# Patient Record
Sex: FEMALE | Race: WHITE | NOT HISPANIC OR LATINO | Employment: FULL TIME | URBAN - METROPOLITAN AREA
[De-identification: names, ages, dates, MRNs, and addresses within clinical notes are randomized per-mention and may not be internally consistent; named-entity substitution may affect disease eponyms.]

---

## 2021-05-31 ENCOUNTER — RECORDS - HEALTHEAST (OUTPATIENT)
Dept: ADMINISTRATIVE | Facility: CLINIC | Age: 51
End: 2021-05-31

## 2024-11-22 ENCOUNTER — ANCILLARY PROCEDURE (OUTPATIENT)
Dept: MAMMOGRAPHY | Facility: HOSPITAL | Age: 54
End: 2024-11-22
Attending: OBSTETRICS & GYNECOLOGY
Payer: COMMERCIAL

## 2024-11-22 DIAGNOSIS — Z12.39 ENCOUNTER FOR OTHER SCREENING FOR MALIGNANT NEOPLASM OF BREAST: ICD-10-CM

## 2024-11-22 PROCEDURE — 77063 BREAST TOMOSYNTHESIS BI: CPT

## 2024-11-22 PROCEDURE — 77067 SCR MAMMO BI INCL CAD: CPT

## 2025-06-02 ENCOUNTER — TRANSCRIBE ORDERS (OUTPATIENT)
Dept: OTHER | Age: 55
End: 2025-06-02

## 2025-06-02 ENCOUNTER — MEDICAL CORRESPONDENCE (OUTPATIENT)
Dept: HEALTH INFORMATION MANAGEMENT | Facility: CLINIC | Age: 55
End: 2025-06-02
Payer: COMMERCIAL

## 2025-06-02 ENCOUNTER — TRANSFERRED RECORDS (OUTPATIENT)
Dept: HEALTH INFORMATION MANAGEMENT | Facility: CLINIC | Age: 55
End: 2025-06-02
Payer: COMMERCIAL

## 2025-06-02 DIAGNOSIS — N18.5 CKD (CHRONIC KIDNEY DISEASE) STAGE 5, GFR LESS THAN 15 ML/MIN (H): Primary | ICD-10-CM

## 2025-07-15 ENCOUNTER — OFFICE VISIT (OUTPATIENT)
Dept: SURGERY | Facility: CLINIC | Age: 55
End: 2025-07-15
Attending: INTERNAL MEDICINE
Payer: COMMERCIAL

## 2025-07-15 DIAGNOSIS — N18.5 CKD (CHRONIC KIDNEY DISEASE) STAGE 5, GFR LESS THAN 15 ML/MIN (H): ICD-10-CM

## 2025-07-15 PROCEDURE — 99204 OFFICE O/P NEW MOD 45 MIN: CPT | Performed by: SURGERY

## 2025-07-15 RX ORDER — ACETAMINOPHEN 325 MG/1
650 TABLET ORAL EVERY 6 HOURS PRN
COMMUNITY

## 2025-07-15 RX ORDER — CLOBETASOL PROPIONATE 0.5 MG/G
1 OINTMENT TOPICAL
COMMUNITY
Start: 2024-03-05

## 2025-07-15 RX ORDER — CETIRIZINE HYDROCHLORIDE 10 MG/1
10 TABLET ORAL DAILY
COMMUNITY

## 2025-07-15 RX ORDER — FAMOTIDINE 20 MG/1
TABLET, FILM COATED ORAL
COMMUNITY

## 2025-07-15 RX ORDER — GABAPENTIN 100 MG/1
CAPSULE ORAL
COMMUNITY

## 2025-07-15 NOTE — PROGRESS NOTES
General Surgery H&P  Adilene Finley MRN# [id number]   Age/Sex: 55 year old female YOB: 1970     Reason for visit: PD catheter eval        Referring physician: Dr. Nice                   Assessment and Plan:   Assessment:  1.  Polycystic kidney disease  2.  History of tubal ligation  3.  Morbid obesity    55-year-old female presenting with a history of polycystic kidney disease.  The patient is currently seeking transplant application at the [facility].  The patient had also considered AV access however the patient has very small veins.  We discussed the risks and benefits of the procedures of PD catheter.  I do believe that the patient is a good candidate for PD catheter.  Given her morbid obesity, I have explained that she does have a higher than average risk of potentially kicking the catheter within the subcutaneous tissue.  However these usually resolve with no significant intervention.    Plan:  -When the patient is ready, we will proceed with a robotic placement of PD catheter.      -If the patient would like to proceed with the PD catheter before its active use, we can also embed the PD catheter as well.    - The risks and benefits of the procedure were explained detail to the patient. The risks include infection, bleeding, damage to the surrounding structures. Patient verbalized understanding provided consent to undergo the procedure above.            Chief Complaint:     Chief Complaint   Patient presents with    Consult     PD cath consult         History is obtained from the patient    HPI:   Adilene Finley is a 55 year old female who presents to the general surgery team today for evaluation regarding PD catheter.  The patient has a history of polycystic kidney disease.  The patient has a strong genetic lineage of the polycystic kidney disease.  She has had multiple family members undergo renal failure from this disease.  Her father had undergone AV fistula formation.  The  patient was worked up for an AV fistula however the patient has very small veins and is not the best AV access candidate the upper extremities.  Patient would like to learn more about PD catheter today.    On evaluation today, the patient states that she has a history of tubal ligation.  No history of diverticulitis or significant bowel adhesions in the past.  The patient states that she is currently on the transplant list at the [facility].  Otherwise she has no further complaints at this time.            Past Medical History:   No past medical history on file.           Past Surgical History:   No past surgical history on file.          Social History:    reports that she quit smoking about 3 years ago. Her smoking use included cigarettes. She started smoking about 4 months ago. She has never used smokeless tobacco.           Family History:   No family history on file.           Allergies:     Allergies   Allergen Reactions    Seasonal Allergies Other (See Comments)     Eyes Water // Runny Nose    Losartan Nausea              Medications:     Prior to Admission medications    Medication Sig Start Date End Date Taking? Authorizing Provider   acetaminophen (TYLENOL) 325 MG tablet Take 650 mg by mouth every 6 hours as needed.   Yes Reported, Patient   cetirizine (ZYRTEC) 10 MG tablet Take 10 mg by mouth daily.   Yes Reported, Patient   clobetasol (TEMOVATE) 0.05 % external ointment Apply 1 Application topically. 3/5/24  Yes Reported, Patient   famotidine (PEPCID) 20 MG tablet Take 1 tablet by mouth 2 times daily as needed for upset stomach or GERD*   Yes Reported, Patient   gabapentin (NEURONTIN) 100 MG capsule TAKE 2 TO 3 CAPSULES BY MOUTH AT BEDTIME AS NEEDED FOR INSOMNIA*   Yes Reported, Patient              Review of Systems:   A 12 point Review of Systems is negative other than noted in the HPI            Physical Exam:   No data found.     [unfilled]   Constitutional:   awake, alert, cooperative, no  apparent distress, and appears stated age       Eyes:   PERRL, conjunctiva/corneas clear, EOM's intact; no scleral edema or icterus noted        ENT:   Normocephalic, without obvious abnormality, atraumatic, Lips, mucosa, and tongue normal        Hematologic / Lymphatic:   No lymphadenopathy       Lungs:   Normal respiratory effort, no accessory muscle use       Cardiovascular:   Regular rate and rhythm       Abdomen:   Soft, nondistended, nontender to palpation.  Obese abdomen       Musculoskeletal:   No obvious swelling, bruising or deformity       Skin:   Skin color and texture normal for patient, no rashes or lesions              Data:         All imaging studies reviewed by me. I reviewed the images, and I agree with polycystic kidney disease      DO Candelario Olguin DO  General Surgeon  [facility]  Surgery [address] [address]?  Office: [phone]  Employed by - [facility] [employer]  Pager: [phone]

## 2025-07-15 NOTE — LETTER
7/15/2025      Adilene Finley  [address] [place] [address]      Dear Colleague,    Thank you for referring your patient, Adilene Finley, to the [facility] [place]. Please see a copy of my visit note below.    General Surgery H&P  Adilene Finley MRN# [id number]   Age/Sex: 55 year old female YOB: 1970     Reason for visit: PD catheter eval        Referring physician: Dr. Nice                   Assessment and Plan:   Assessment:  1.  Polycystic kidney disease  2.  History of tubal ligation  3.  Morbid obesity    55-year-old female presenting with a history of polycystic kidney disease.  The patient is currently seeking transplant application at the [facility].  The patient had also considered AV access however the patient has very small veins.  We discussed the risks and benefits of the procedures of PD catheter.  I do believe that the patient is a good candidate for PD catheter.  Given her morbid obesity, I have explained that she does have a higher than average risk of potentially kicking the catheter within the subcutaneous tissue.  However these usually resolve with no significant intervention.    Plan:  -When the patient is ready, we will proceed with a robotic placement of PD catheter.      -If the patient would like to proceed with the PD catheter before its active use, we can also embed the PD catheter as well.    - The risks and benefits of the procedure were explained detail to the patient. The risks include infection, bleeding, damage to the surrounding structures. Patient verbalized understanding provided consent to undergo the procedure above.            Chief Complaint:     Chief Complaint   Patient presents with     Consult     PD cath consult         History is obtained from the patient    HPI:   Adilene Finley is a 55 year old female who presents to the general surgery team today for evaluation  regarding PD catheter.  The patient has a history of polycystic kidney disease.  The patient has a strong genetic lineage of the polycystic kidney disease.  She has had multiple family members undergo renal failure from this disease.  Her father had undergone AV fistula formation.  The patient was worked up for an AV fistula however the patient has very small veins and is not the best AV access candidate the upper extremities.  Patient would like to learn more about PD catheter today.    On evaluation today, the patient states that she has a history of tubal ligation.  No history of diverticulitis or significant bowel adhesions in the past.  The patient states that she is currently on the transplant list at the [facility].  Otherwise she has no further complaints at this time.            Past Medical History:   No past medical history on file.           Past Surgical History:   No past surgical history on file.          Social History:    reports that she quit smoking about 3 years ago. Her smoking use included cigarettes. She started smoking about 4 months ago. She has never used smokeless tobacco.           Family History:   No family history on file.           Allergies:     Allergies   Allergen Reactions     Seasonal Allergies Other (See Comments)     Eyes Water // Runny Nose     Losartan Nausea              Medications:     Prior to Admission medications    Medication Sig Start Date End Date Taking? Authorizing Provider   acetaminophen (TYLENOL) 325 MG tablet Take 650 mg by mouth every 6 hours as needed.   Yes Reported, Patient   cetirizine (ZYRTEC) 10 MG tablet Take 10 mg by mouth daily.   Yes Reported, Patient   clobetasol (TEMOVATE) 0.05 % external ointment Apply 1 Application topically. 3/5/24  Yes Reported, Patient   famotidine (PEPCID) 20 MG tablet Take 1 tablet by mouth 2 times daily as needed for upset stomach or GERD*   Yes Reported, Patient   gabapentin (NEURONTIN) 100 MG capsule TAKE 2 TO 3 CAPSULES  BY MOUTH AT BEDTIME AS NEEDED FOR INSOMNIA*   Yes Reported, Patient              Review of Systems:   A 12 point Review of Systems is negative other than noted in the HPI            Physical Exam:   No data found.     [unfilled]   Constitutional:   awake, alert, cooperative, no apparent distress, and appears stated age       Eyes:   PERRL, conjunctiva/corneas clear, EOM's intact; no scleral edema or icterus noted        ENT:   Normocephalic, without obvious abnormality, atraumatic, Lips, mucosa, and tongue normal        Hematologic / Lymphatic:   No lymphadenopathy       Lungs:   Normal respiratory effort, no accessory muscle use       Cardiovascular:   Regular rate and rhythm       Abdomen:   Soft, nondistended, nontender to palpation.  Obese abdomen       Musculoskeletal:   No obvious swelling, bruising or deformity       Skin:   Skin color and texture normal for patient, no rashes or lesions              Data:         All imaging studies reviewed by me. I reviewed the images, and I agree with polycystic kidney disease      DO Candelario Olguin DO  General Surgeon  [facility]  Surgery [address] [address]?  Office: [phone]  Employed by - [facility]  Pager: [phone]       Again, thank you for allowing me to participate in the care of your patient.        Sincerely,        Candelario Lazo DO    Electronically signed